# Patient Record
Sex: FEMALE | Race: BLACK OR AFRICAN AMERICAN | NOT HISPANIC OR LATINO | Employment: FULL TIME | URBAN - METROPOLITAN AREA
[De-identification: names, ages, dates, MRNs, and addresses within clinical notes are randomized per-mention and may not be internally consistent; named-entity substitution may affect disease eponyms.]

---

## 2017-05-14 ENCOUNTER — HOSPITAL ENCOUNTER (EMERGENCY)
Facility: CLINIC | Age: 22
Discharge: HOME | End: 2017-05-14
Attending: INTERNAL MEDICINE | Admitting: INTERNAL MEDICINE

## 2017-05-14 VITALS
SYSTOLIC BLOOD PRESSURE: 100 MMHG | OXYGEN SATURATION: 99 % | RESPIRATION RATE: 16 BRPM | DIASTOLIC BLOOD PRESSURE: 65 MMHG | TEMPERATURE: 98.3 F

## 2017-05-14 DIAGNOSIS — L01.00 IMPETIGO: ICD-10-CM

## 2017-05-14 PROCEDURE — 99282 EMERGENCY DEPT VISIT SF MDM: CPT

## 2017-05-14 RX ORDER — MUPIROCIN CALCIUM 20 MG/G
1 CREAM TOPICAL 3 TIMES DAILY
Qty: 30 G | Refills: 0 | Status: SHIPPED | OUTPATIENT
Start: 2017-05-14

## 2017-05-14 RX ORDER — BENZOCAINE/MENTHOL 6 MG-10 MG
LOZENGE MUCOUS MEMBRANE 2 TIMES DAILY
Qty: 30 G | Refills: 0 | Status: SHIPPED | OUTPATIENT
Start: 2017-05-14

## 2017-05-14 NOTE — ED PROVIDER NOTES
History     Chief Complaint:  Rash    HPI   Jag Dallas is a 22 year old female who presents with rash to the face and under chin that has been itchy. These symptoms started yesterday. The patient notes rescuing a kitten yesterday. The patient denies tongue or throat swelling.    Allergies:  No known drug allergies.    Medications:    The patient is currently on no regular medications.    Past Medical History:    History reviewed.  No significant past medical history.     Past Surgical History:    EGD x2    Family History:    History reviewed. No pertinent family history.    Social History:  Marital Status: Single  Presents to the ED alone  PCP: Physician No Ref-Primary     Review of Systems   Skin: Positive for rash (face).   All other systems reviewed and are negative.    Physical Exam   First Vitals:  BP: 100/65  Heart Rate: 85  Temp: 98.3  F (36.8  C)  Resp: 16  SpO2: 99 %    Physical Exam   Constitutional: She is cooperative.   HENT:   Head:       Area of rash    Neurological: She is alert.   Skin: Rash noted.   Erythema over lower face, including chin  Areas of weeping, with honey crusting right chin     Emergency Department Course   ED Course:  Nursing notes and past medical history reviewed.   I performed a physical examination of the patient as documented above.  I explained the plan with the patient who consents to this.   Findings and plan explained to the patient. Patient discharged home with instructions regarding supportive care, medications, and reasons to return. The importance of close follow-up was reviewed.     Impression & Plan    Medical Decision Making:  Jag Dallas is a 22 year old woman who presents to the ED complaining of rash on the lower face. This could be a contact dermatitis. She points out an area of yellowish crusting raising the possibility of impetigo. She does not have fever or other systemic symptoms to suggest cellulitis. We will treat topically with Bactroban as well as  Hydrocortisone. 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 She may work as long as she does not have direct contact with patients' skin. Return if worse, follow up if not better in 3-5 days.    Diagnosis:    ICD-10-CM    1. Impetigo L01.00        Disposition:   Discharge to home.     Discharge Medications:   Discharge Medication List as of 5/14/2017  1:22 PM      START taking these medications    Details   mupirocin (BACTROBAN) 2 % cream Apply 15 g topically 3 times dailyDisp-30 g, R-0Local Print      hydrocortisone (CORTAID) 1 % cream Apply topically 2 times dailyDisp-30 g, R-0Local Print               IGokul, am serving as a scribe on 5/14/2017 at 1:17 PM to personally document services performed by Ana Luisa Contreras MD, based on my observations and the provider's statements to me.       Ana Luisa Contreras MD  05/14/17 [id number]

## 2017-05-14 NOTE — ED NOTES
"Patient notes the skin on the side of her face and under her chin has been \"super itchy\" since yesterday. States this has occurred before but symptoms did not continue for as long. Area on her face is red and raised and not in individual hives like rash. Denies shortness of breath or lip or tongue swelling.  "

## 2017-05-14 NOTE — LETTER
[facility] EMERGENCY DEPARTMENT  [address] [place] [address]  [phone]    May 14, 2017    Jag Dallas  [address] [place] [address]  [phone] (home)     : 1995      To Whom it may concern:    Jag Dallas was seen in our Emergency Department today, May 14, 2017.  She does not represent an infectious risk and may work.     Sincerely,        Ana Luisa Contreras

## 2017-05-14 NOTE — ED AVS SNAPSHOT
[facility] Emergency Department    [address]    [facility] [address]    Phone:  [phone]    Fax:  [phone]                                       Jag Dallas   MRN: [id number]    Department:  [facility] Emergency Department   Date of Visit:  5/14/2017           After Visit Summary Signature Page     I have received my discharge instructions, and my questions have been answered. I have discussed any challenges I see with this plan with the nurse or doctor.    ..........................................................................................................................................  Patient/Patient Representative Signature      ..........................................................................................................................................  Patient Representative Print Name and Relationship to Patient    ..................................................               ................................................  Date                                            Time    ..........................................................................................................................................  Reviewed by Signature/Title    ...................................................              ..............................................  Date                                                            Time

## 2018-03-07 ENCOUNTER — OFFICE VISIT (OUTPATIENT)
Dept: OPTOMETRY | Facility: CLINIC | Age: 23
End: 2018-03-07
Payer: COMMERCIAL

## 2018-03-07 DIAGNOSIS — H01.001 BLEPHARITIS OF UPPER EYELIDS OF BOTH EYES, UNSPECIFIED TYPE: ICD-10-CM

## 2018-03-07 DIAGNOSIS — H10.13 ALLERGIC CONJUNCTIVITIS OF BOTH EYES: Primary | ICD-10-CM

## 2018-03-07 DIAGNOSIS — H01.004 BLEPHARITIS OF UPPER EYELIDS OF BOTH EYES, UNSPECIFIED TYPE: ICD-10-CM

## 2018-03-07 PROCEDURE — 92012 INTRM OPH EXAM EST PATIENT: CPT | Performed by: OPTOMETRIST

## 2018-03-07 RX ORDER — AZELASTINE HYDROCHLORIDE 0.5 MG/ML
1 SOLUTION/ DROPS OPHTHALMIC 2 TIMES DAILY
Qty: 1 BOTTLE | Refills: 6 | Status: SHIPPED | OUTPATIENT
Start: 2018-03-07

## 2018-03-07 ASSESSMENT — VISUAL ACUITY
OD_SC+: -1
METHOD: SNELLEN - LINEAR
OD_SC: 20/20
OS_SC: 20/20

## 2018-03-07 ASSESSMENT — EXTERNAL EXAM - LEFT EYE: OS_EXAM: NORMAL

## 2018-03-07 ASSESSMENT — EXTERNAL EXAM - RIGHT EYE: OD_EXAM: NORMAL

## 2018-03-07 NOTE — LETTER
3/7/2018         RE: Jag Dallas  [address] [place] [address]        Dear Colleague,    [facility] you [facility] Dallas[facility]. Please see a copy of my visit note below.    Chief Complaint   Patient presents with     Eye Problem Both Eyes     Red, watery   Started a few weeks ago, put cucumber on top of them yesterday.  Denies rhinitis   On computer 10 hrs per day  Not using any eye drops  Eyes itch in the corners, no discharge     Do you wear contact lenses? No    HPI    Symptoms:     Blurred vision   Tearing                      Radha Draper, OD     See Review Of Systems    Medical, surgical and family histories reviewed and updated 3/7/2018.         OBJECTIVE: See Ophthalmology exam    ASSESSMENT:    ICD-10-CM    1. Allergic conjunctivitis of both eyes H10.13 azelastine (OPTIVAR) 0.05 % SOLN ophthalmic solution   2. Blepharitis of upper eyelids of both eyes, unspecified type H01.001     H01.004         PLAN:  Allergy treatment     Radha Draper OD     Again, thank you for allowing me to participate in the care of your patient.        Sincerely,        Radha Draper, OD

## 2018-03-07 NOTE — PROGRESS NOTES
Chief Complaint   Patient presents with     Eye Problem Both Eyes     Red, watery   Started a few weeks ago, put cucumber on top of them yesterday.  Denies rhinitis   On computer 10 hrs per day  Not using any eye drops  Eyes itch in the corners, no discharge     Do you wear contact lenses? No    HPI    Symptoms:     Blurred vision   Tearing                      Radha Draper, OD     See Review Of Systems    Medical, surgical and family histories reviewed and updated 3/7/2018.         OBJECTIVE: See Ophthalmology exam    ASSESSMENT:    ICD-10-CM    1. Allergic conjunctivitis of both eyes H10.13 azelastine (OPTIVAR) 0.05 % SOLN ophthalmic solution   2. Blepharitis of upper eyelids of both eyes, unspecified type H01.001     H01.004         PLAN:  Allergy treatment     Radha Draper OD

## 2018-03-07 NOTE — PATIENT INSTRUCTIONS
Use allergy drops in both eyes 2 x day along with cold compresses    And frequent eye/ brow washing will help for itchy, watery eyes.   Using continuously for 2 weeks will have better efficacy    You may also use chilled artificial tears such as blink , refresh or systane which are over the counter during the day a couple of times

## 2018-03-07 NOTE — MR AVS SNAPSHOT
"              After Visit Summary   3/7/2018    Jag Dallas    MRN: [id number]           Patient Information     Date Of Birth          1995        Visit Information        Provider Department      3/7/2018 2:00 PM [facility]        Today's Diagnoses     Allergic conjunctivitis of both eyes    -  1    Blepharitis of upper eyelids of both eyes, unspecified type          Care Instructions    Use allergy drops in both eyes 2 x day along with cold compresses    And frequent eye/ brow washing will help for itchy, watery eyes.   Using continuously for 2 weeks will have better efficacy    You may also use chilled artificial tears such as blink , refresh or systane which are over the counter during the day a couple of times               Follow-ups after your visit        Who to contact     If you have questions or need follow up information about today's clinic visit or your schedule please contact [facility] directly at [phone].  Normal or non-critical lab and imaging results will be communicated to you by MyChart, letter or phone within 4 business days after the clinic has received the results. If you do not hear from us within 7 days, please contact the clinic through [facility] or phone. If you have a critical or abnormal lab result, we will notify you by phone as soon as possible.  Submit refill requests through [employer] or call your pharmacy and they will forward the refill request to us. Please allow 3 business days for your refill to be completed.          Additional Information About Your Visit        [facility] Information     [employer] lets you send messages to your doctor, view your test results, renew your prescriptions, schedule appointments and more. To sign up, go to [email or web address] . Click on \"Log in\" on the left side of the screen, which will take you to the Welcome page. Then click on \"Sign up Now\" on the right side of the page.     You will " be asked to enter the access code listed below, as well as some personal information. Please follow the directions to create your username and password.     Your access code is: [email or web address]  Expires: 2018  2:33 PM     Your access code will  in 90 days. If you need help or a new code, please call your [facility] clinic or [phone].        Care EveryWhere ID     This is your Care EveryWhere ID. This could be used by other organizations to access your [facility] medical records  [id number]         Blood Pressure from Last 3 Encounters:   17 100/65   10/16/16 106/81   16 107/67    Weight from Last 3 Encounters:   10/16/16 49.9 kg (110 lb 0.2 oz)   16 49.9 kg (110 lb)   14 50 kg (110 lb 3.7 oz) (21 %)*     * Growth percentiles are based on [facility] 2-20 Years data.              Today, you had the following     No orders found for display       Primary Care Provider Fax #    Physician No Ref-Primary [phone]       No address on file        Equal Access to Services     [facility] : Hadii linda felder hadtyeshao Sojurgen, waaxda luqadaha, qaybta kaalmada adebinta, ayden garrett . So [facility] [phone].    ATENCIÓN: Si habla español, tiene a reyes disposición servicios gratuitos de asistencia lingüística. Lauritaame al [phone].    We comply with applicable federal civil rights laws and [place] laws. We do not discriminate on the basis of race, color, national origin, age, disability, sex, sexual orientation, or gender identity.            Thank you!     Thank you for choosing [facility]3] MARCY  for your care. Our goal is always to provide you with excellent care. Hearing back from our patients is one way we can continue to improve our services. Please take a few minutes to complete the written survey that you may receive in the mail after your visit with us. Thank you!             Your Updated Medication List - Protect others around you: Learn how to safely use,  store and throw away your medicines at www.disposemymeds.org.          This list is accurate as of 3/7/18  2:33 PM.  Always use your most recent med list.                   Brand Name Dispense Instructions for use Diagnosis    hydrocortisone 1 % cream    CORTAID    30 g    Apply topically 2 times daily        mupirocin 2 % cream    BACTROBAN    30 g    Apply 15 g topically 3 times daily

## 2024-01-30 ENCOUNTER — HOSPITAL ENCOUNTER (EMERGENCY)
Facility: CLINIC | Age: 29
Discharge: HOME | End: 2024-01-30
Attending: EMERGENCY MEDICINE | Admitting: EMERGENCY MEDICINE
Payer: COMMERCIAL

## 2024-01-30 VITALS
SYSTOLIC BLOOD PRESSURE: 115 MMHG | HEART RATE: 88 BPM | TEMPERATURE: 98.6 F | RESPIRATION RATE: 18 BRPM | OXYGEN SATURATION: 99 % | DIASTOLIC BLOOD PRESSURE: 68 MMHG

## 2024-01-30 DIAGNOSIS — J02.0 STREP PHARYNGITIS: ICD-10-CM

## 2024-01-30 LAB
FLUAV RNA SPEC QL NAA+PROBE: NEGATIVE
FLUBV RNA RESP QL NAA+PROBE: NEGATIVE
GROUP A STREP BY PCR: DETECTED
RSV RNA SPEC NAA+PROBE: NEGATIVE
SARS-COV-2 RNA RESP QL NAA+PROBE: NEGATIVE

## 2024-01-30 PROCEDURE — 99283 EMERGENCY DEPT VISIT LOW MDM: CPT

## 2024-01-30 PROCEDURE — 87651 STREP A DNA AMP PROBE: CPT | Performed by: EMERGENCY MEDICINE

## 2024-01-30 PROCEDURE — 87637 SARSCOV2&INF A&B&RSV AMP PRB: CPT | Performed by: EMERGENCY MEDICINE

## 2024-01-30 PROCEDURE — 250N000013 HC RX MED GY IP 250 OP 250 PS 637: Performed by: EMERGENCY MEDICINE

## 2024-01-30 RX ORDER — AMOXICILLIN 500 MG/1
500 CAPSULE ORAL 3 TIMES DAILY
Qty: 21 CAPSULE | Refills: 0 | Status: SHIPPED | OUTPATIENT
Start: 2024-01-30 | End: 2024-02-06

## 2024-01-30 RX ADMIN — Medication 6 MG: at 14:11

## 2024-01-30 NOTE — ED TRIAGE NOTES
Patient c/o sore throat, headache, neck ache for the past 2 days. Denies fevers and cough, taking over the counter theraflu. Feels like she can't function. ABCs intact. VSS.

## 2024-01-30 NOTE — ED PROVIDER NOTES
History     Chief Complaint:  Pharyngitis     HPI   Jag Dallas is a 29 year old female who presents to the ED for evaluation of a sore throat. The patient reports she developed a sore throat two days ago with mild headache, bilateral ear pain, and has felt feverish. She denies nausea, vomiting, cough, shortness of breath, or trouble swallowing fluids. She notes exposure to illness.    Independent Historian:   None - Patient Only    Review of External Notes:   None    Medications:    No daily medications    Past Medical History:    No past medical history .    Past Surgical History:    No past surgical history     Physical Exam   Patient Vitals for the past 24 hrs:   BP Temp Temp src Pulse Resp SpO2   01/30/24 1321 115/68 98.6  F (37  C) Oral 88 18 99 %        Physical Exam  Gen:  Pleasant, appears stated age.    Eye:   Sclera non-injected.      ENT:  Moist mucus membranes.  Normal tongue.  Oropharynx with 3-4 petechiae in posterior OP; slightly erythematous.  No exudate or tonsillar hypertrophy.  Normal uvula, in midline.  No anterior cervical LYN.  Tracheal cartilage non-tender.  Full flexion and extension of neck.  Normal voice.  No stridor.  No drooling.    Musculoskeletal:     Normal movement of all extremities without evidence for deficit.    Extremities:    No edema.  Atraumatic.      Skin:   Warm and dry.  No rash.    Neurologic:    Non-focal exam without asymmetric weakness or numbness.    Fluent speech.    Psychiatric:     Normal affect with appropriate interaction with examiner.      Emergency Department Course   Laboratory:  Labs Ordered and Resulted from Time of ED Arrival to Time of ED Departure   GROUP A STREPTOCOCCUS PCR THROAT SWAB - Abnormal       Result Value    Group A strep by PCR Detected (*)    INFLUENZA A/B, RSV, & SARS-COV2 PCR - Normal    Influenza A PCR Negative      Influenza B PCR Negative      RSV PCR Negative      SARS CoV2 PCR Negative        Emergency Department Course &  Assessments:     Interventions:  Medications   dexAMETHasone (DECADRON) alcohol-free oral solution 6 mg (6 mg Oral $Given 1/30/24 1411)      Independent Interpretation (X-rays, CTs, rhythm strip):  None    Consultations/Assessments/Discussion of Management or Tests:   ED Course as of 01/30/24 1444   Tue Jan 30, 2024   1403 Initial examination   1444 Rechecked patient and discussed results     Social Determinants of Health affecting care:   None    Disposition:  The patient was discharged.     Impression & Plan    CMS Diagnoses: None    Medical Decision Making:  Jag Dallas is a 29 year old female who presents for evaluation of a sore throat and clinical evidence of pharyngitis. COVID-19, RSV, and influenza testing negative. The rapid strep test is positive. There is no clinical evidence of peritonsillar abscess, retropharyngeal abscess, Lemierre's Syndrome, epiglottis, or Bobby's angina. The patient's symptoms are consistent with streptococcal pharyngitis.  I have recommended treatment with antibiotics and analgesics. The patient was given a dose of decadron in the ER today. She is able to tolerate oral fluids.  S/He will return if increasing pain, change in voice, neck pain, vomiting, fever, failure to improve, or for other concerns.       Diagnosis:    ICD-10-CM    1. Strep pharyngitis  J02.0            Discharge Medications:  Discharge Medication List as of 1/30/2024  2:51 PM        START taking these medications    Details   amoxicillin (AMOXIL) 500 MG capsule Take 1 capsule (500 mg) by mouth 3 times daily for 7 days, Disp-21 capsule, R-0, E-Prescribe            Scribe Disclosure:  I, Glynn Parson, am serving as a scribe at 2:07 PM on 1/30/2024 to document services personally performed by Annemarie Diaz MD based on my observations and the provider's statements to me.     1/30/2024   Annemarie Diaz MD Pepper, Tracy Lynn, MD  01/30/24 [id number]